# Patient Record
Sex: MALE | Race: WHITE
[De-identification: names, ages, dates, MRNs, and addresses within clinical notes are randomized per-mention and may not be internally consistent; named-entity substitution may affect disease eponyms.]

---

## 2020-01-12 ENCOUNTER — HOSPITAL ENCOUNTER (INPATIENT)
Dept: HOSPITAL 96 - M.ERS | Age: 60
LOS: 3 days | Discharge: HOME | DRG: 193 | End: 2020-01-15
Attending: INTERNAL MEDICINE | Admitting: INTERNAL MEDICINE
Payer: COMMERCIAL

## 2020-01-12 VITALS — SYSTOLIC BLOOD PRESSURE: 122 MMHG | DIASTOLIC BLOOD PRESSURE: 77 MMHG

## 2020-01-12 VITALS — HEIGHT: 72.01 IN | WEIGHT: 224.3 LBS | BODY MASS INDEX: 30.38 KG/M2

## 2020-01-12 VITALS — DIASTOLIC BLOOD PRESSURE: 67 MMHG | SYSTOLIC BLOOD PRESSURE: 165 MMHG

## 2020-01-12 VITALS — DIASTOLIC BLOOD PRESSURE: 82 MMHG | SYSTOLIC BLOOD PRESSURE: 144 MMHG

## 2020-01-12 DIAGNOSIS — J11.1: ICD-10-CM

## 2020-01-12 DIAGNOSIS — I26.99: ICD-10-CM

## 2020-01-12 DIAGNOSIS — I50.9: ICD-10-CM

## 2020-01-12 DIAGNOSIS — I24.9: ICD-10-CM

## 2020-01-12 DIAGNOSIS — Z79.899: ICD-10-CM

## 2020-01-12 DIAGNOSIS — F17.210: ICD-10-CM

## 2020-01-12 DIAGNOSIS — J44.1: ICD-10-CM

## 2020-01-12 DIAGNOSIS — J44.0: ICD-10-CM

## 2020-01-12 DIAGNOSIS — J18.9: Primary | ICD-10-CM

## 2020-01-12 DIAGNOSIS — Z87.442: ICD-10-CM

## 2020-01-12 LAB
ABSOLUTE BASOPHILS: 0.1 THOU/UL (ref 0–0.2)
ABSOLUTE EOSINOPHILS: 0.2 THOU/UL (ref 0–0.7)
ABSOLUTE MONOCYTES: 1 THOU/UL (ref 0–1.2)
ALBUMIN SERPL-MCNC: 3.9 G/DL (ref 3.4–5)
ALP SERPL-CCNC: 84 U/L (ref 46–116)
ALT SERPL-CCNC: 42 U/L (ref 30–65)
ANION GAP SERPL CALC-SCNC: 11 MMOL/L (ref 7–16)
AST SERPL-CCNC: 21 U/L (ref 15–37)
BACTERIA-REFLEX: (no result) /HPF
BASOPHILS NFR BLD AUTO: 0.8 %
BE: -5.8 MMOL/L
BILIRUB SERPL-MCNC: 0.4 MG/DL
BILIRUB UR-MCNC: NEGATIVE MG/DL
BUN SERPL-MCNC: 7 MG/DL (ref 7–18)
CALCIUM SERPL-MCNC: 8.6 MG/DL (ref 8.5–10.1)
CHLORIDE SERPL-SCNC: 102 MMOL/L (ref 98–107)
CO2 SERPL-SCNC: 27 MMOL/L (ref 21–32)
COLOR UR: YELLOW
CREAT SERPL-MCNC: 1.1 MG/DL (ref 0.6–1.3)
EOSINOPHIL NFR BLD: 1.7 %
GLUCOSE SERPL-MCNC: 98 MG/DL (ref 70–99)
GRANULOCYTES NFR BLD MANUAL: 75.2 %
HCT VFR BLD CALC: 50.4 % (ref 42–52)
HGB BLD-MCNC: 17.1 GM/DL (ref 14–18)
INR PPP: 1
KETONES UR STRIP-MCNC: NEGATIVE MG/DL
LYMPHOCYTES # BLD: 1.3 THOU/UL (ref 0.8–5.3)
LYMPHOCYTES NFR BLD AUTO: 12.7 %
MAGNESIUM SERPL-MCNC: 1.9 MG/DL (ref 1.8–2.4)
MCH RBC QN AUTO: 32.2 PG (ref 26–34)
MCHC RBC AUTO-ENTMCNC: 33.9 G/DL (ref 28–37)
MCV RBC: 95.2 FL (ref 80–100)
MONOCYTES NFR BLD: 9.6 %
MPV: 8.2 FL. (ref 7.2–11.1)
MUCUS: (no result) STRN/LPF
NEUTROPHILS # BLD: 7.9 THOU/UL (ref 1.6–8.1)
NUCLEATED RBCS: 0 /100WBC
PCO2 BLD: 33.6 MMHG (ref 35–45)
PLATELET COUNT*: 200 THOU/UL (ref 150–400)
PO2 BLD: 56 MMHG (ref 75–100)
POTASSIUM SERPL-SCNC: 3.9 MMOL/L (ref 3.5–5.1)
PROT SERPL-MCNC: 8.3 G/DL (ref 6.4–8.2)
PROT UR QL STRIP: NEGATIVE
PROTHROMBIN TIME: 10.2 SECONDS (ref 9.2–11.5)
RBC # BLD AUTO: 5.3 MIL/UL (ref 4.5–6)
RBC # UR STRIP: (no result) /UL
RBC #/AREA URNS HPF: (no result) /HPF (ref 0–2)
RDW-CV: 14.1 % (ref 10.5–14.5)
SODIUM SERPL-SCNC: 140 MMOL/L (ref 136–145)
SP GR UR STRIP: 1.02 (ref 1–1.03)
SQUAMOUS: (no result) /LPF (ref 0–3)
URINE CLARITY: CLEAR
URINE GLUCOSE-RANDOM: NEGATIVE
URINE LEUKOCYTES-REFLEX: NEGATIVE
URINE NITRITE-REFLEX: NEGATIVE
URINE WBC-REFLEX: (no result) /HPF (ref 0–5)
UROBILINOGEN UR STRIP-ACNC: 0.2 E.U./DL (ref 0.2–1)
WBC # BLD AUTO: 10.5 THOU/UL (ref 4–11)

## 2020-01-13 VITALS — DIASTOLIC BLOOD PRESSURE: 70 MMHG | SYSTOLIC BLOOD PRESSURE: 108 MMHG

## 2020-01-13 VITALS — SYSTOLIC BLOOD PRESSURE: 115 MMHG | DIASTOLIC BLOOD PRESSURE: 76 MMHG

## 2020-01-13 VITALS — DIASTOLIC BLOOD PRESSURE: 80 MMHG | SYSTOLIC BLOOD PRESSURE: 117 MMHG

## 2020-01-13 VITALS — DIASTOLIC BLOOD PRESSURE: 77 MMHG | SYSTOLIC BLOOD PRESSURE: 143 MMHG

## 2020-01-13 VITALS — SYSTOLIC BLOOD PRESSURE: 119 MMHG | DIASTOLIC BLOOD PRESSURE: 78 MMHG

## 2020-01-13 VITALS — SYSTOLIC BLOOD PRESSURE: 137 MMHG | DIASTOLIC BLOOD PRESSURE: 78 MMHG

## 2020-01-13 LAB
ANION GAP SERPL CALC-SCNC: 12 MMOL/L (ref 7–16)
BUN SERPL-MCNC: 13 MG/DL (ref 7–18)
CALCIUM SERPL-MCNC: 8.4 MG/DL (ref 8.5–10.1)
CHLORIDE SERPL-SCNC: 105 MMOL/L (ref 98–107)
CO2 SERPL-SCNC: 23 MMOL/L (ref 21–32)
CREAT SERPL-MCNC: 1 MG/DL (ref 0.6–1.3)
GLUCOSE SERPL-MCNC: 112 MG/DL (ref 70–99)
MAGNESIUM SERPL-MCNC: 1.9 MG/DL (ref 1.8–2.4)
POTASSIUM SERPL-SCNC: 4 MMOL/L (ref 3.5–5.1)
SODIUM SERPL-SCNC: 140 MMOL/L (ref 136–145)

## 2020-01-13 NOTE — EKG
[place] [address]
Phone:  [phone]                     ELECTROCARDIOGRAM REPORT      
_______________________________________________________________________________
 
Name:       ELMERMITRA                  Room:           [address]    ADM IN  
M.R.#:  [id number]      Account #:      [id number]  
Admission:  20     Attend Phys:    XANDER Armstrong
Discharge:               Date of Birth:  [birth year]  
         Report #: [id number]
    [id number]
_______________________________________________________________________________
THIS REPORT FOR:  //name//                      
 
                         [facility] ED
                                       
Test Date:    2020               Test Time:    12:28:25
Pat Name:     MITRA PAYNE            Department:   
Patient ID:   [id number]            Room:         [facility]
Gender:       M                        Technician:   [facility]
:          1960               Requested By: Nancy Greenwood
Order Number: [id number] MD:   Charlie Carrasquillo
                                 Measurements
Intervals                              Axis          
Rate:         95                       P:            82
GA:           147                      QRS:          -73
QRSD:         99                       T:            89
QT:           341                                    
QTc:          429                                    
                           Interpretive Statements
Sinus rhythm
Left anterior fascicular block
Nonspecific T abnrm, anterolateral leads
No previous ECG available for comparison
 
Electronically Signed On 2020 17:02:51 CST by Charlie Carrasquillo
[email or web address]
 
 
 
 
 
 
 
 
 
 
 
 
 
 
 
 
 
 
  <ELECTRONICALLY SIGNED>
                                           By: Charlie Carrasquillo MD, [facility]   
  20     1702
D: 20 1228   _____________________________________
T: 20 1228   Charlie Carrasquillo MD, FAC     /EPI

## 2020-01-14 VITALS — DIASTOLIC BLOOD PRESSURE: 86 MMHG | SYSTOLIC BLOOD PRESSURE: 129 MMHG

## 2020-01-14 VITALS — SYSTOLIC BLOOD PRESSURE: 119 MMHG | DIASTOLIC BLOOD PRESSURE: 82 MMHG

## 2020-01-14 VITALS — SYSTOLIC BLOOD PRESSURE: 136 MMHG | DIASTOLIC BLOOD PRESSURE: 46 MMHG

## 2020-01-14 VITALS — DIASTOLIC BLOOD PRESSURE: 79 MMHG | SYSTOLIC BLOOD PRESSURE: 124 MMHG

## 2020-01-14 VITALS — SYSTOLIC BLOOD PRESSURE: 160 MMHG | DIASTOLIC BLOOD PRESSURE: 83 MMHG

## 2020-01-14 VITALS — SYSTOLIC BLOOD PRESSURE: 113 MMHG | DIASTOLIC BLOOD PRESSURE: 75 MMHG

## 2020-01-14 VITALS — DIASTOLIC BLOOD PRESSURE: 74 MMHG | SYSTOLIC BLOOD PRESSURE: 129 MMHG

## 2020-01-15 VITALS — DIASTOLIC BLOOD PRESSURE: 73 MMHG | SYSTOLIC BLOOD PRESSURE: 112 MMHG

## 2020-01-15 VITALS — DIASTOLIC BLOOD PRESSURE: 72 MMHG | SYSTOLIC BLOOD PRESSURE: 141 MMHG

## 2020-01-15 VITALS — SYSTOLIC BLOOD PRESSURE: 112 MMHG | DIASTOLIC BLOOD PRESSURE: 73 MMHG

## 2020-01-15 VITALS — SYSTOLIC BLOOD PRESSURE: 139 MMHG | DIASTOLIC BLOOD PRESSURE: 88 MMHG

## 2020-01-16 VITALS — SYSTOLIC BLOOD PRESSURE: 131 MMHG | DIASTOLIC BLOOD PRESSURE: 77 MMHG

## 2020-11-30 ENCOUNTER — HOSPITAL ENCOUNTER (INPATIENT)
Dept: HOSPITAL 96 - M.ERS | Age: 60
LOS: 3 days | Discharge: HOME | DRG: 189 | End: 2020-12-03
Attending: INTERNAL MEDICINE | Admitting: INTERNAL MEDICINE
Payer: COMMERCIAL

## 2020-11-30 VITALS — BODY MASS INDEX: 31.15 KG/M2 | WEIGHT: 230 LBS | HEIGHT: 72.01 IN

## 2020-11-30 VITALS — SYSTOLIC BLOOD PRESSURE: 154 MMHG | DIASTOLIC BLOOD PRESSURE: 102 MMHG

## 2020-11-30 DIAGNOSIS — Z87.442: ICD-10-CM

## 2020-11-30 DIAGNOSIS — G47.10: ICD-10-CM

## 2020-11-30 DIAGNOSIS — J96.01: Primary | ICD-10-CM

## 2020-11-30 DIAGNOSIS — E66.9: ICD-10-CM

## 2020-11-30 DIAGNOSIS — Z71.6: ICD-10-CM

## 2020-11-30 DIAGNOSIS — J44.1: ICD-10-CM

## 2020-11-30 DIAGNOSIS — Z20.828: ICD-10-CM

## 2020-11-30 DIAGNOSIS — B37.9: ICD-10-CM

## 2020-11-30 DIAGNOSIS — F17.210: ICD-10-CM

## 2020-11-30 LAB
ABSOLUTE BASOPHILS: 0.1 THOU/UL (ref 0–0.2)
ABSOLUTE EOSINOPHILS: 0.6 THOU/UL (ref 0–0.7)
ABSOLUTE MONOCYTES: 0.8 THOU/UL (ref 0–1.2)
BASOPHILS NFR BLD AUTO: 0.5 %
EOSINOPHIL NFR BLD: 5.6 %
GRANULOCYTES NFR BLD MANUAL: 69 %
HCT VFR BLD CALC: 48.2 % (ref 42–52)
HGB BLD-MCNC: 16.1 GM/DL (ref 14–18)
LYMPHOCYTES # BLD: 2.1 THOU/UL (ref 0.8–5.3)
LYMPHOCYTES NFR BLD AUTO: 18 %
MCH RBC QN AUTO: 32.1 PG (ref 26–34)
MCHC RBC AUTO-ENTMCNC: 33.4 G/DL (ref 28–37)
MCV RBC: 96.1 FL (ref 80–100)
MONOCYTES NFR BLD: 6.9 %
MPV: 7.8 FL. (ref 7.2–11.1)
NEUTROPHILS # BLD: 7.9 THOU/UL (ref 1.6–8.1)
NUCLEATED RBCS: 0 /100WBC
PLATELET COUNT*: 180 THOU/UL (ref 150–400)
RBC # BLD AUTO: 5.01 MIL/UL (ref 4.5–6)
RDW-CV: 13.7 % (ref 10.5–14.5)
WBC # BLD AUTO: 11.4 THOU/UL (ref 4–11)

## 2020-11-30 NOTE — EMS
[address]  [address]                    EMS Patient Care Report       
_______________________________________________________________________________
 
Name:       MITRA PAYNE                  Room:           [address]    ADM IN  
M.R.#:  [id number]      Account #:      [id number]  
Admission:  20     Attend Phys:    Jasvir Gilbert MD 
Discharge:               Date of Birth:  [birth year]  
         Report #: [id number]
                                                                     [id number]
_______________________________________________________________________________
THIS REPORT FOR:  //name//                      
 
Report Transmitted: 2020 11:57
EMS Care Summary
[facility] Aria FISHER
Incident [id number] @ 2020 22:11
 
Incident Location
[address] [place] [address]
 
Patient
Mitra Payne
Male, 60 Years
 1960
 
Patient Address
[address] [place] [address]
 
Patient History
Chronic Obstructive Pulmonary Disease (COPD),
 
Patient Allergies
No known allergies,
 
 
Chief Complaint
Shortness of Breath
 
Disposition
Transported No Lights/[place]
 
Dispatch Reason
Breathing Problem
 
Transported To
[facility]
 
Narrative
on scene of a 59 y/o male c/o SOB. pt found sitting in tripod position on chair 
a/ox4 in moderate distress. per pt he has been SOB for the last 4 days and it 
has been increasing. per pt he ran out of medication about 1 month ago. SPO2 
checked and lungs checked. pt given o2 via neb mask and albuterol and atrovent. 
pt noted to have increase in SPO2 and decrease work of breathing. pt requested 
transport to [facility]. pt was assisted to [facility] and during transfer pt work 
 
 
 
[address]  [address]                    EMS Patient Care Report       
_______________________________________________________________________________
 
Name:       MITRA PAYNE                  Room:           229-    ADM IN  
M.R.#:  [id number]      Account #:      [id number]  
Admission:  20     Attend Phys:    Jasvir Gilbert MD 
Discharge:               Date of Birth:  [birth year]  
         Report #: [id number]
                                                                     [id number]
_______________________________________________________________________________
of breathing increased as well as an increase in anxiety. pt moved to micu and 
vitals checked and monitor showed NSR. pt calmed down for transport. pt 
transported to [facility] c-2. en route pt reassessed. lungs still had wheezing 
noted. pt given second dose of albuterol via neb mask. pt denies any c/p abd 
pain N/V dizziness headache. IV established in left hand 20g with a saline lock 
using aseptic technique. secondary had no other complaints noted. vitals 
rechecked with no changes noted. arrive [facility] transfer to ER RN in pt room. 
 
Initial Vitals
@22:22SpO2: 92,
@22:29SpO2: 97,
@22:31SpO2: 95,
@22:42SpO2: 98,
@22:31
@22:22P: 85,R: 30,BP: 166/100,
@22:31P: 90,R: 24,BP: 189/144,
@22:43P: 86,R: 24,BP: 142/97,
@22:22GCS: 15,
@22:31GCS: 15,
@22:43GCS: 15,
@22:19
 
Assessments
@22:19MENTAL:SKIN:HEENT:LUNG 
SOUNDS:ABDOMEN:PELVIS//GI:EXTREMITIES:PULSE:NEURO: 
 
Impression
Acute Respiratory Distress (Dyspnea)
 
Procedures
@22:22Other - Medication - 8.000 Liters per Minute (l/min [fluid]) - 
InhalationResponse: Improved@22:22Albuterol - 2.500 Milligrams (mg) - 
InhalationResponse: Improved@22:22Ipratropium - 0.500 Milligrams (mg) - 
InhalationResponse: Improved@22:31Albuterol - 2.500 Milligrams (mg) - 
InhalationResponse: Improved@22:37 cc () Site: Hand-LeftResponse: 
ImprovedSucceeded@22:313-Lead ECGResponse: UnchangedSucceeded 
 
Timeline
22:19,Call Received
22:11,Dispatch Notified
22:11,Psap Call
22:11,Dispatched
22:12,En Route
22:16,On Scene
22:19,At Patient
22:19,BP: / M,PULSE: ,RR:  R,SPO2:  Ox,ETCO2:  ,BG: ,PAIN: ,GCS: ,
 
 
 
[place] [address]                    EMS Patient Care Report       
_______________________________________________________________________________
 
Name:       ELMERMITRA                  Room:           [address]    ADM IN  
.R.#:  [id number]      Account #:      [id number]  
Admission:  20     Attend Phys:    Jasvir Gilbert MD 
Discharge:               Date of Birth:  [birth year]  
         Report #: [id number]
                                                                     [id number]
_______________________________________________________________________________
22:22,Other - Medication - 8.000 Liters per Minute (l/min [fluid]) - 
Inhalation,Response: Improved 
22:22,Albuterol - 2.500 Milligrams (mg) - Inhalation,Response: Improved
22:22,Ipratropium - 0.500 Milligrams (mg) - Inhalation,Response: Improved
22:22,BP: / M,PULSE: ,RR:  R,SPO2: 92 Ox,ETCO2:  ,BG: ,PAIN: ,GCS: ,
22:22,BP: 166/100 M,PULSE: 85,RR: 30 R,SPO2:  Ox,ETCO2:  ,BG: ,PAIN: ,GCS: ,
22:22,BP: / M,PULSE: ,RR:  R,SPO2:  Ox,ETCO2:  ,BG: ,PAIN: ,GCS: 15,
22:29,BP: / M,PULSE: ,RR:  R,SPO2: 97 Ox,ETCO2:  ,BG: ,PAIN: ,GCS: ,
22:31,BP: / M,PULSE: ,RR:  R,SPO2: 95 Ox,ETCO2:  ,BG: ,PAIN: ,GCS: ,
22:31,BP: 189/144 M,PULSE: 90,RR: 24 R,SPO2:  Ox,ETCO2:  ,BG: ,PAIN: ,GCS: ,
22:31,BP: / M,PULSE: ,RR:  R,SPO2:  Ox,ETCO2:  ,BG: ,PAIN: ,GCS: 15,
22:31,Depart Scene
22:31,Albuterol - 2.500 Milligrams (mg) - Inhalation,Response: Improved
22:31,3-Lead ECG,Response: UnchangedSucceeded,
22:31,BP: / M,PULSE: ,RR:  R,SPO2:  Ox,ETCO2:  ,BG: ,PAIN: ,GCS: ,
22:37, cc  Site: Hand-Left,Response: ImprovedSucceeded,
22:42,BP: / M,PULSE: ,RR:  R,SPO2: 98 Ox,ETCO2:  ,BG: ,PAIN: ,GCS: ,
22:42,At Destination
22:43,BP: 142/97 M,PULSE: 86,RR: 24 R,SPO2:  Ox,ETCO2:  ,BG: ,PAIN: ,GCS: ,
22:43,BP: / M,PULSE: ,RR:  R,SPO2:  Ox,ETCO2:  ,BG: ,PAIN: ,GCS: 15,
22:55,Call Closed
 
Disclaimer
v1.1     Copyright 2020 [employer]
This EMS Care Summary contains data elements from the applicable legal record 
(which may be displayed differently). It is designed to provide pertinent 
information for the following purposes: continuity of care, clinical quality, 
and state data reporting. The complete legal record is available to ED staff 
and administrators of the receiving hospital in [employer] Patient Tracker. All data 
is provided "as is."

## 2020-12-01 VITALS — DIASTOLIC BLOOD PRESSURE: 78 MMHG | SYSTOLIC BLOOD PRESSURE: 124 MMHG

## 2020-12-01 VITALS — SYSTOLIC BLOOD PRESSURE: 104 MMHG | DIASTOLIC BLOOD PRESSURE: 66 MMHG

## 2020-12-01 VITALS — DIASTOLIC BLOOD PRESSURE: 64 MMHG | SYSTOLIC BLOOD PRESSURE: 102 MMHG

## 2020-12-01 VITALS — DIASTOLIC BLOOD PRESSURE: 80 MMHG | SYSTOLIC BLOOD PRESSURE: 134 MMHG

## 2020-12-01 VITALS — SYSTOLIC BLOOD PRESSURE: 134 MMHG | DIASTOLIC BLOOD PRESSURE: 80 MMHG

## 2020-12-01 VITALS — SYSTOLIC BLOOD PRESSURE: 113 MMHG | DIASTOLIC BLOOD PRESSURE: 66 MMHG

## 2020-12-01 LAB
ALBUMIN SERPL-MCNC: 3.5 G/DL (ref 3.4–5)
ALP SERPL-CCNC: 66 U/L (ref 46–116)
ALT SERPL-CCNC: 31 U/L (ref 30–65)
ANION GAP SERPL CALC-SCNC: 9 MMOL/L (ref 7–16)
AST SERPL-CCNC: 18 U/L (ref 15–37)
BE: -5.1 MMOL/L
BILIRUB SERPL-MCNC: 0.5 MG/DL
BUN SERPL-MCNC: 7 MG/DL (ref 7–18)
CALCIUM SERPL-MCNC: 8.1 MG/DL (ref 8.5–10.1)
CHLORIDE SERPL-SCNC: 103 MMOL/L (ref 98–107)
CO2 SERPL-SCNC: 26 MMOL/L (ref 21–32)
CREAT SERPL-MCNC: 0.9 MG/DL (ref 0.6–1.3)
GLUCOSE SERPL-MCNC: 112 MG/DL (ref 70–99)
INR PPP: 1
LIPASE: 56 U/L (ref 73–393)
PCO2 BLD: 37.3 MMHG (ref 35–45)
PO2 BLD: 107.5 MMHG (ref 75–100)
POTASSIUM SERPL-SCNC: 3.8 MMOL/L (ref 3.5–5.1)
PROT SERPL-MCNC: 7.2 G/DL (ref 6.4–8.2)
PROTHROMBIN TIME: 10.7 SECONDS (ref 9.2–11.5)
SODIUM SERPL-SCNC: 138 MMOL/L (ref 136–145)

## 2020-12-01 PROCEDURE — 5A09357 ASSISTANCE WITH RESPIRATORY VENTILATION, LESS THAN 24 CONSECUTIVE HOURS, CONTINUOUS POSITIVE AIRWAY PRESSURE: ICD-10-PCS | Performed by: INTERNAL MEDICINE

## 2020-12-01 NOTE — EKG
[place] [address]
Phone:  [phone]                     ELECTROCARDIOGRAM REPORT      
_______________________________________________________________________________
 
Name:         MITRA PAYNE                 Room:          [address]    ADM IN 
.R.#:    [id number]     Account #:     [id number]  
Admission:    20    Attend Phys:   Jasvir Gilbert, 
Discharge:                Date of Birth: [birth year]  
Date of Service: 20 2251  Report #:      [id number]
        [id number]
_______________________________________________________________________________
THIS REPORT FOR:  //name//                      
 
                         [facility] ED
                                       
Test Date:    2020               Test Time:    22:51:42
Pat Name:     MITRA PAYNE            Department:   
Patient ID:   [id number]            Room:         [facility]
Gender:       M                        Technician:   [employer]
:          1960               Requested By: Nancy Greenwood
Order Number: [id number] MD:   Mayco Weeks
                                 Measurements
Intervals                              Axis          
Rate:         90                       P:            83
GA:           152                      QRS:          -75
QRSD:         102                      T:            83
QT:           366                                    
QTc:          448                                    
                           Interpretive Statements
Sinus rhythm with pac
 
Left anterior fascicular block
Baseline wander in lead(s) V1,V3
Compared to ECG 2020 12:28:25
no change
Electronically Signed On 2020 10:06:43 CST by Mayco Weeks
[email or web address]
 
 
 
 
 
 
 
 
 
 
 
 
 
 
 
 
 
 
  <ELECTRONICALLY SIGNED>
                                           By: Mayco Weeks MD, FACC      
  20     1006
D: 20   _____________________________________
T: 20   Mayco Weeks MD, FAC        /EPI

## 2020-12-01 NOTE — NUR
PT ORIENTED TO ROOM AND UNIT, LORETTA LOW AND LOCKED, SIDE RAILS UPX3, CALL LIGHT
IN REACH, TELE APPLIED. WILL CONTINUE TO ASSESS.

## 2020-12-02 VITALS — DIASTOLIC BLOOD PRESSURE: 77 MMHG | SYSTOLIC BLOOD PRESSURE: 118 MMHG

## 2020-12-02 VITALS — SYSTOLIC BLOOD PRESSURE: 129 MMHG | DIASTOLIC BLOOD PRESSURE: 77 MMHG

## 2020-12-02 VITALS — DIASTOLIC BLOOD PRESSURE: 65 MMHG | SYSTOLIC BLOOD PRESSURE: 109 MMHG

## 2020-12-02 VITALS — DIASTOLIC BLOOD PRESSURE: 68 MMHG | SYSTOLIC BLOOD PRESSURE: 142 MMHG

## 2020-12-02 VITALS — DIASTOLIC BLOOD PRESSURE: 62 MMHG | SYSTOLIC BLOOD PRESSURE: 111 MMHG

## 2020-12-02 VITALS — SYSTOLIC BLOOD PRESSURE: 121 MMHG | DIASTOLIC BLOOD PRESSURE: 64 MMHG

## 2020-12-02 NOTE — NUR
ASSUMED PT CARE AT 0730, PT AOX4, NO C/O PAIN OR SHORTNESS OF BREATH BUT
STATES HE BECOMES SHORT OF BREATH SOMETIMES WHEN HE WALKS AROUND. PT TITRATED
FROM 4L TO RA, SATTING ABOVE 90%. PT AMBULATED HALLS THIS AFTERNOON. PT NEEDS
SYMBALTA SCRIPT FOR NEXT 30 DAYS SINCE HIS INSURANCE DOESN'T KICK IN UNTIL JAN
1ST, CM CONSULTED FOR THIS AND AWAITING SCRIPT. PT GOAL IS TO KEEP SATS ABOVE
90% AND REMAIN FREE FROM SHORTNESS OF BREATH. AM ASSESSMENT AS CHARTED, MEDS
PER MAR, HOURLY ROUNDING OBSERVED, PT UP AD ROM, CALL LIGHT W/IN REACH.

## 2020-12-02 NOTE — NUR
CM SPOKE TO THE PT TO COMPLETE CM ASSESSMENT. PT A&O, INDEPENDENT WITH ADL'S,
AND WORKS OUTSIDE THE HOME. PT OWNS 0 DME. PT HAS 0 HX OF HH OR SNF. PT
INFROMS THAT HE DOES NOT HAVE INSURANCE, BUT INFORMS THAT HE WILL HAVE
INSURANCE IN JANUARY. PT ALSO INFORMS THAT HE AS BEEN GOING TO THE '[facility]' FOR MEDICAL CARE AND MANAGEMENT, AND WILL CONTINUE TO USE THEM AT
D/C DUE TO NOT HAVING INSURANCE. PT ASSESSED BY MED ASSIST AND IS MEDICAID
PENDING STATUS. PATIENT'S INSURANCE STATUS MAY BE A BARRIER TO D/C PLANNING IF
THE PT WOULD NEED HOME O2 OR OTHER DME. PT IS CURRENTLY ON 4L O2 AND USING
BIPAP AT [facility]. CM WILL REMAIN AVAILABLE TO ASSIST AND FOLLOW AS NEEDED.

## 2020-12-02 NOTE — NUR
ASSUMED CARE AT 1920, ON [facility]. ALERT AND ORIENT. NO DISTRESS. PT
ASKING FOR SYMBICORT COUPON FROM THE NP HE TALKED YESTERDAY. HIS HOPING TO GET
BEFOR DISCHARGE. CONTINUE MONITORING AND TOWARDS GOALS.

## 2020-12-03 VITALS — SYSTOLIC BLOOD PRESSURE: 123 MMHG | DIASTOLIC BLOOD PRESSURE: 82 MMHG

## 2020-12-03 VITALS — DIASTOLIC BLOOD PRESSURE: 100 MMHG | SYSTOLIC BLOOD PRESSURE: 169 MMHG

## 2020-12-03 VITALS — DIASTOLIC BLOOD PRESSURE: 82 MMHG | SYSTOLIC BLOOD PRESSURE: 123 MMHG

## 2020-12-03 VITALS — SYSTOLIC BLOOD PRESSURE: 121 MMHG | DIASTOLIC BLOOD PRESSURE: 77 MMHG

## 2020-12-03 VITALS — SYSTOLIC BLOOD PRESSURE: 126 MMHG | DIASTOLIC BLOOD PRESSURE: 75 MMHG

## 2020-12-03 VITALS — DIASTOLIC BLOOD PRESSURE: 55 MMHG | SYSTOLIC BLOOD PRESSURE: 99 MMHG

## 2020-12-03 LAB
ABSOLUTE MONOCYTES: 0.8 THOU/UL (ref 0–1.2)
ALBUMIN SERPL-MCNC: 3.1 G/DL (ref 3.4–5)
ALP SERPL-CCNC: 54 U/L (ref 46–116)
ALT SERPL-CCNC: 38 U/L (ref 30–65)
ANION GAP SERPL CALC-SCNC: 10 MMOL/L (ref 7–16)
AST SERPL-CCNC: 23 U/L (ref 15–37)
BILIRUB SERPL-MCNC: 0.2 MG/DL
BUN SERPL-MCNC: 16 MG/DL (ref 7–18)
CALCIUM SERPL-MCNC: 7.9 MG/DL (ref 8.5–10.1)
CHLORIDE SERPL-SCNC: 104 MMOL/L (ref 98–107)
CO2 SERPL-SCNC: 23 MMOL/L (ref 21–32)
CREAT SERPL-MCNC: 0.9 MG/DL (ref 0.6–1.3)
GLUCOSE SERPL-MCNC: 151 MG/DL (ref 70–99)
GRANULOCYTES NFR BLD MANUAL: 89 %
HCT VFR BLD CALC: 47.3 % (ref 42–52)
HGB BLD-MCNC: 15.5 GM/DL (ref 14–18)
LYMPHOCYTES # BLD: 1.4 THOU/UL (ref 0.8–5.3)
LYMPHOCYTES NFR BLD AUTO: 7 %
MCH RBC QN AUTO: 31.9 PG (ref 26–34)
MCHC RBC AUTO-ENTMCNC: 32.8 G/DL (ref 28–37)
MCV RBC: 97.2 FL (ref 80–100)
MONOCYTES NFR BLD: 4 %
MPV: 8.2 FL. (ref 7.2–11.1)
NEUTROPHILS # BLD: 17.2 THOU/UL (ref 1.6–8.1)
NUCLEATED RBCS: 0 /100WBC
PLATELET # BLD EST: ADEQUATE 10*3/UL
PLATELET COUNT*: 177 THOU/UL (ref 150–400)
POTASSIUM SERPL-SCNC: 4 MMOL/L (ref 3.5–5.1)
PROT SERPL-MCNC: 6.7 G/DL (ref 6.4–8.2)
RBC # BLD AUTO: 4.87 MIL/UL (ref 4.5–6)
RBC MORPH BLD: NORMAL
RDW-CV: 14.1 % (ref 10.5–14.5)
SODIUM SERPL-SCNC: 137 MMOL/L (ref 136–145)
WBC # BLD AUTO: 19.3 THOU/UL (ref 4–11)

## 2020-12-03 NOTE — NUR
ASSUMED PT CARE AT 0730, PT AOX4, NO C/O PAIN OR SHORTNESS OF BREATH. PT
TITRATED TO RA AGAIN, SATS ABOVE 90%, PT WORKED W/ RT AND DID WALKING O2
MONITOR, DID WELL. PT GOT SCRIPT FOR SYMBICORT COVERED BY HOSPITAL AND SCRIPT
FOR PREDNISONE CALLED INTO [address] [facility]. PT BEING PICKED UP BY
[facility] AT APPROX 1823, PT DC'D W/ NURSING STAFF TO FRONT ENTRANCE AT APPROX 1815
W/ ALL PAPERWORK AND BELONGINGS TO [employer]. PT COMMUNICATES UNDERSTANDING OF DC
TEACHING.

## 2020-12-03 NOTE — NUR
CM SPOKE TO THE PT TO DISCUSS D/C PLANNING AND CM ASSISTING PT WITH
MEDICATIONS. PT ACCEPTS ASSISSTANCE WITH PRESCRIPTION FOR STYMBICORT. CM
CALLED AND FAXED PT'S SCRIPT TO [facility]. PT INFORMED THAT HE WILL NOT
HAVE COPAY AMOUNT FOR SYMBICORT. PT TEST BY R.T. AND PT DOES NOT NEED HOME
OXYGEN AT D/C. CM WILLL REMAIN AVAILABLE TO ASSIST AND FOLLOW AS NEEDED.

## 2020-12-04 NOTE — CON
[address]  [address]                    CONSULTATION                  
_______________________________________________________________________________
 
Name:       MITRA PAYNE                  Room:           [address] [place]  
M.R.#:  [id number]      Account #:      [id number]  
Admission:  12/01/20     Attend Phys:    Jasvir Gilbert MD 
Discharge:  12/03/20     Date of Birth:  [birth year]  
         Report #: [id number]
                                                                     [id number]  
_______________________________________________________________________________
THIS REPORT FOR:  //name//                      
 
cc:  Xu Gunderson DO                 
     MouseXu DO                                                        ~
 
 
DATE OF SERVICE:  12/03/2020
 
 
Consult has been requested by Viktoriya Schmidt.
 
INDICATION FOR CONSULTATION:  COPD exacerbation.
 
HISTORY OF PRESENT ILLNESS:  A 60-year-old gentleman.  He is an active smoker
and does have a history of COPD, was on Symbicort at home, which he ran out of. 
He subsequently did self-medicate with prednisone; however, continued to have
increasing shortness of breath as well as a cough with yellow sputum production;
and therefore, eventually was admitted here.  He only has mild swelling of lower
extremities.  He is not describing fever or chills.  He has had some runny nose
and blocked nose, which remain at baseline.  He has some sleep complaints, which
remain at baseline.
 
Initially, he was hypoxemic.  He did require a BiPAP.  He is now off BiPAP.  He
was on 4 liters nasal cannula this morning, which has now been titrated down to
2.  He reports improvement in shortness of breath.  He has been on Solu-Medrol. 
He has not been on broad-spectrum antibiotics.
 
REVIEW OF SYSTEMS:  The patient's review of systems for 12 points is negative
except as mentioned above.
 
PAST MEDICAL HISTORY:  COPD, kidney stones, tonsillitis, lower abdominal hernia.
 
SOCIAL HISTORY:  Active smoker, has been smoking for several decades. 
Occasional alcohol use.  No known history of illegal drug use.
 
CURRENT MEDICATIONS:  List in [employer] reviewed.
 
HOME MEDICATIONS:  List also in [employer] reviewed.  Also, see discussion above.
 
FAMILY HISTORY:  No pertinent family history.
 
PHYSICAL EXAMINATION:
GENERAL:  Alert, awake and oriented.
VITAL SIGNS:  He has a pulse of 84 and a blood pressure of 169/100 this morning.
 This, however, appears to be an isolated reading.  Most blood pressures are
within the normal range.  His oxygen had been turned down to 2 liters.  On 4
 
 
 
[place] [address]                    CONSULTATION                  
_______________________________________________________________________________
 
Name:       MITRA PAYNE                  Room:           [address] [place]  
[facility]#:  [id number]      Account #:      [id number]  
Admission:  12/01/20     Attend Phys:    Jasvir Gilbert MD 
Discharge:  12/03/20     Date of Birth:  [birth year]  
         Report #: [id number]
                                                                     [id number]  
_______________________________________________________________________________
 
 
liters, he was saturating 94% earlier.  He has a pulse of 84.  He is afebrile
with a temperature of 36.6.
HEENT:  Head is normocephalic and atraumatic.  Pupils are equal and reactive. 
He does have thrush in his throat.  He has a narrow airway.
NECK:  Does not show raised JVP, asymmetry, mass or lymph nodes.
CHEST:  Symmetrical expansion on inspection and palpation.  On auscultation,
breath sounds are bilaterally equal, decreased.  No added sounds.
HEART:  Regular.  There is no murmur.
ABDOMEN:  Soft and nontender.
EXTREMITIES:  Lower extremities show trace edema, no calf tenderness.
SKIN:  Dry and intact.
NEUROLOGICAL:  Moves all extremities bilaterally equally and spontaneously with
no focal deficit identified.
 
LABORATORY DATA:  The patient's lab work is in [facility].  This was reviewed. 
The patient's chest x-ray from 11/30 also in [facility] reviewed, shows changes
consistent with COPD with no acute infiltrates identified.
 
ASSESSMENT AND PLAN:
1.  Acute hypoxemic respiratory failure secondary to chronic obstructive
pulmonary disease.  We will do a chest x-ray now and see where we stand. 
Recommended trying to taper him off oxygen.  If the patient is able to maintain
O2 saturations on room air then I do not feel strongly either way regarding
discharging him today or tomorrow morning.  If he is still requiring
supplemental oxygen at rest then I would favor keeping him in the hospital
tomorrow.  Assessment is underway.
2.  Chronic obstructive pulmonary disease exacerbation.  I agree with
Solu-Medrol, which has now been discontinued.  He remains on oxygen.  I may
order more Solu-Medrol for this evening.  Otherwise, I agree with starting a
prednisone taper tomorrow.  He has had yellow sputum production, which is the
reason that I ordered doxycycline.  If there are significant infiltrates seen on
the chest x-ray then additional antibiotics can also be considered.
3.  Thrush.  Fluconazole.
4.  Hypersomnia.  It appears likely that he has underlying sleep apnea.  I will
be happy to arrange an outpatient sleep study.
5.  I would also be happy to see the patient in the office in January and then
assess further.
 
Thanks for this consultation.
 
 
 
<ELECTRONICALLY SIGNED>
                                        By:  Quinton Yee MD              
12/04/20     1441
D: 12/03/20 1206_______________________________________
T: 12/03/20 1221Aisi Yee MD                 /nt

## 2021-01-14 ENCOUNTER — HOSPITAL ENCOUNTER (INPATIENT)
Dept: HOSPITAL 96 - M.ERS | Age: 61
LOS: 2 days | Discharge: HOME | DRG: 189 | End: 2021-01-16
Attending: INTERNAL MEDICINE | Admitting: INTERNAL MEDICINE
Payer: COMMERCIAL

## 2021-01-14 VITALS — DIASTOLIC BLOOD PRESSURE: 80 MMHG | SYSTOLIC BLOOD PRESSURE: 127 MMHG

## 2021-01-14 VITALS — SYSTOLIC BLOOD PRESSURE: 143 MMHG | DIASTOLIC BLOOD PRESSURE: 94 MMHG

## 2021-01-14 VITALS — WEIGHT: 253 LBS | HEIGHT: 72.01 IN | BODY MASS INDEX: 34.27 KG/M2

## 2021-01-14 DIAGNOSIS — J44.1: ICD-10-CM

## 2021-01-14 DIAGNOSIS — E66.01: ICD-10-CM

## 2021-01-14 DIAGNOSIS — F17.200: ICD-10-CM

## 2021-01-14 DIAGNOSIS — Z79.899: ICD-10-CM

## 2021-01-14 DIAGNOSIS — T38.0X5A: ICD-10-CM

## 2021-01-14 DIAGNOSIS — J96.01: Primary | ICD-10-CM

## 2021-01-14 DIAGNOSIS — Z20.822: ICD-10-CM

## 2021-01-14 DIAGNOSIS — R73.9: ICD-10-CM
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
## 2021-01-14 LAB
ABSOLUTE BASOPHILS: 0.1 THOU/UL (ref 0–0.2)
ABSOLUTE EOSINOPHILS: 0.5 THOU/UL (ref 0–0.7)
ABSOLUTE MONOCYTES: 0.9 THOU/UL (ref 0–1.2)
ALBUMIN SERPL-MCNC: 3.7 G/DL (ref 3.4–5)
ALP SERPL-CCNC: 73 U/L (ref 46–116)
ALT SERPL-CCNC: 33 U/L (ref 30–65)
ANION GAP SERPL CALC-SCNC: 8 MMOL/L (ref 7–16)
APTT BLD: 27.6 SECONDS (ref 25–31.3)
AST SERPL-CCNC: 16 U/L (ref 15–37)
BASOPHILS NFR BLD AUTO: 0.8 %
BE: -2.5 MMOL/L
BILIRUB SERPL-MCNC: 0.4 MG/DL
BUN SERPL-MCNC: 11 MG/DL (ref 7–18)
CALCIUM SERPL-MCNC: 8.4 MG/DL (ref 8.5–10.1)
CHLORIDE SERPL-SCNC: 107 MMOL/L (ref 98–107)
CO2 SERPL-SCNC: 27 MMOL/L (ref 21–32)
CREAT SERPL-MCNC: 0.9 MG/DL (ref 0.6–1.3)
EOSINOPHIL NFR BLD: 4.5 %
GLUCOSE SERPL-MCNC: 107 MG/DL (ref 70–99)
GRANULOCYTES NFR BLD MANUAL: 65.2 %
HCT VFR BLD CALC: 49.8 % (ref 42–52)
HGB BLD-MCNC: 16.5 GM/DL (ref 14–18)
INR PPP: 1
LYMPHOCYTES # BLD: 2.6 THOU/UL (ref 0.8–5.3)
LYMPHOCYTES NFR BLD AUTO: 22.2 %
MAGNESIUM SERPL-MCNC: 2.2 MG/DL (ref 1.8–2.4)
MCH RBC QN AUTO: 31.6 PG (ref 26–34)
MCHC RBC AUTO-ENTMCNC: 33.2 G/DL (ref 28–37)
MCV RBC: 95.1 FL (ref 80–100)
MONOCYTES NFR BLD: 7.3 %
MPV: 8.2 FL. (ref 7.2–11.1)
NEUTROPHILS # BLD: 7.7 THOU/UL (ref 1.6–8.1)
NUCLEATED RBCS: 0 /100WBC
PCO2 BLD: 37.5 MMHG (ref 35–45)
PLATELET COUNT*: 187 THOU/UL (ref 150–400)
PO2 BLD: 65.6 MMHG (ref 75–100)
POTASSIUM SERPL-SCNC: 3.8 MMOL/L (ref 3.5–5.1)
PROT SERPL-MCNC: 7.4 G/DL (ref 6.4–8.2)
PROTHROMBIN TIME: 10.3 SECONDS (ref 9.2–11.5)
RBC # BLD AUTO: 5.23 MIL/UL (ref 4.5–6)
RDW-CV: 14 % (ref 10.5–14.5)
SODIUM SERPL-SCNC: 142 MMOL/L (ref 136–145)
WBC # BLD AUTO: 11.8 THOU/UL (ref 4–11)

## 2021-01-15 VITALS — DIASTOLIC BLOOD PRESSURE: 86 MMHG | SYSTOLIC BLOOD PRESSURE: 146 MMHG

## 2021-01-15 VITALS — DIASTOLIC BLOOD PRESSURE: 85 MMHG | SYSTOLIC BLOOD PRESSURE: 123 MMHG

## 2021-01-15 VITALS — SYSTOLIC BLOOD PRESSURE: 154 MMHG | DIASTOLIC BLOOD PRESSURE: 96 MMHG

## 2021-01-15 VITALS — SYSTOLIC BLOOD PRESSURE: 146 MMHG | DIASTOLIC BLOOD PRESSURE: 95 MMHG

## 2021-01-15 VITALS — DIASTOLIC BLOOD PRESSURE: 82 MMHG | SYSTOLIC BLOOD PRESSURE: 146 MMHG

## 2021-01-15 LAB
BILIRUB UR-MCNC: NEGATIVE MG/DL
COLOR UR: YELLOW
KETONES UR STRIP-MCNC: NEGATIVE MG/DL
PROT UR QL STRIP: NEGATIVE
RBC # UR STRIP: NEGATIVE /UL
SP GR UR STRIP: 1.01 (ref 1–1.03)
URINE CLARITY: CLEAR
URINE GLUCOSE-RANDOM: NEGATIVE
URINE LEUKOCYTES-REFLEX: NEGATIVE
URINE NITRITE-REFLEX: NEGATIVE
UROBILINOGEN UR STRIP-ACNC: 0.2 E.U./DL (ref 0.2–1)

## 2021-01-15 NOTE — EKG
[place] [address]
Phone:  [phone]                     ELECTROCARDIOGRAM REPORT      
_______________________________________________________________________________
 
Name:         MITRA PAYNE                 Room:          [address]    ADM IN 
.R.#:    [id number]     Account #:     [id number]  
Admission:    21    Attend Phys:   Jasvir Gilbert, 
Discharge:                Date of Birth: [birth year]  
Date of Service: 21  Report #:      [id number]
        [id number]
_______________________________________________________________________________
THIS REPORT FOR:  //name//                      
 
                         [facility] ED
                                       
Test Date:    2021               Test Time:    19:56:22
Pat Name:     MITRA PAYNE            Department:   
Patient ID:   [id number]            Room:         [facility]
Gender:       M                        Technician:   [place]
:          1960               Requested By: Nancy Greenwood
Order Number: [id number] MD:   Charlie Carrasquillo
                                 Measurements
Intervals                              Axis          
Rate:         91                       P:            69
NE:           148                      QRS:          -73
QRSD:         98                       T:            86
QT:           351                                    
QTc:          432                                    
                           Interpretive Statements
Sinus rhythm
Left anterior fascicular block
Compared to ECG 2020 22:51:42
Atrial premature complex(es) no longer present
Electronically Signed On 1- 12:57:37 CST by Charlie Carrasquillo
[email or web address]
 
 
 
 
 
 
 
 
 
 
 
 
 
 
 
 
 
 
 
 
  <ELECTRONICALLY SIGNED>
                                           By: Charlie Carrasquillo MD, FACC   
  01/15/21     1257
D: 21   _____________________________________
T: 21   Charlie Carrasquillo MD, FAC     /EPI

## 2021-01-16 VITALS — SYSTOLIC BLOOD PRESSURE: 121 MMHG | DIASTOLIC BLOOD PRESSURE: 86 MMHG

## 2021-01-16 VITALS — DIASTOLIC BLOOD PRESSURE: 86 MMHG | SYSTOLIC BLOOD PRESSURE: 121 MMHG

## 2021-01-16 VITALS — DIASTOLIC BLOOD PRESSURE: 82 MMHG | SYSTOLIC BLOOD PRESSURE: 136 MMHG

## 2021-01-16 LAB
ANION GAP SERPL CALC-SCNC: 12 MMOL/L (ref 7–16)
BUN SERPL-MCNC: 14 MG/DL (ref 7–18)
CALCIUM SERPL-MCNC: 8.8 MG/DL (ref 8.5–10.1)
CHLORIDE SERPL-SCNC: 106 MMOL/L (ref 98–107)
CO2 SERPL-SCNC: 22 MMOL/L (ref 21–32)
CREAT SERPL-MCNC: 1 MG/DL (ref 0.6–1.3)
GLUCOSE SERPL-MCNC: 134 MG/DL (ref 70–99)
HCT VFR BLD CALC: 47.5 % (ref 42–52)
HGB BLD-MCNC: 15.7 GM/DL (ref 14–18)
MCH RBC QN AUTO: 31.4 PG (ref 26–34)
MCHC RBC AUTO-ENTMCNC: 33 G/DL (ref 28–37)
MCV RBC: 95.1 FL (ref 80–100)
MPV: 8.7 FL. (ref 7.2–11.1)
PLATELET COUNT*: 215 THOU/UL (ref 150–400)
POTASSIUM SERPL-SCNC: 4.1 MMOL/L (ref 3.5–5.1)
RBC # BLD AUTO: 4.99 MIL/UL (ref 4.5–6)
RDW-CV: 13.8 % (ref 10.5–14.5)
SODIUM SERPL-SCNC: 140 MMOL/L (ref 136–145)
WBC # BLD AUTO: 18.6 THOU/UL (ref 4–11)

## 2021-02-09 ENCOUNTER — HOSPITAL ENCOUNTER (EMERGENCY)
Dept: HOSPITAL 96 - M.ERS | Age: 61
Discharge: HOME | End: 2021-02-09
Payer: COMMERCIAL

## 2021-02-09 VITALS — SYSTOLIC BLOOD PRESSURE: 115 MMHG | DIASTOLIC BLOOD PRESSURE: 75 MMHG

## 2021-02-09 VITALS — BODY MASS INDEX: 34.48 KG/M2 | HEIGHT: 72 IN | WEIGHT: 254.57 LBS

## 2021-02-09 DIAGNOSIS — Z20.822: ICD-10-CM

## 2021-02-09 DIAGNOSIS — Z87.442: ICD-10-CM

## 2021-02-09 DIAGNOSIS — J44.9: Primary | ICD-10-CM

## 2021-02-09 DIAGNOSIS — Z90.89: ICD-10-CM

## 2021-02-09 LAB
ABSOLUTE BASOPHILS: 0.1 THOU/UL (ref 0–0.2)
ABSOLUTE EOSINOPHILS: 0.3 THOU/UL (ref 0–0.7)
ABSOLUTE MONOCYTES: 0.7 THOU/UL (ref 0–1.2)
ALBUMIN SERPL-MCNC: 3.5 G/DL (ref 3.4–5)
ALP SERPL-CCNC: 62 U/L (ref 46–116)
ALT SERPL-CCNC: 32 U/L (ref 30–65)
ANION GAP SERPL CALC-SCNC: 7 MMOL/L (ref 7–16)
APTT BLD: 28.3 SECONDS (ref 25–31.3)
AST SERPL-CCNC: 16 U/L (ref 15–37)
BASOPHILS NFR BLD AUTO: 0.7 %
BILIRUB SERPL-MCNC: 0.4 MG/DL
BUN SERPL-MCNC: 11 MG/DL (ref 7–18)
CALCIUM SERPL-MCNC: 8.5 MG/DL (ref 8.5–10.1)
CHLORIDE SERPL-SCNC: 106 MMOL/L (ref 98–107)
CO2 SERPL-SCNC: 25 MMOL/L (ref 21–32)
CREAT SERPL-MCNC: 1 MG/DL (ref 0.6–1.3)
EOSINOPHIL NFR BLD: 3.7 %
GLUCOSE SERPL-MCNC: 106 MG/DL (ref 70–99)
GRANULOCYTES NFR BLD MANUAL: 70 %
HCT VFR BLD CALC: 47.2 % (ref 42–52)
HGB BLD-MCNC: 15.9 GM/DL (ref 14–18)
INR PPP: 1
LIPASE: 106 U/L (ref 73–393)
LYMPHOCYTES # BLD: 1.6 THOU/UL (ref 0.8–5.3)
LYMPHOCYTES NFR BLD AUTO: 17.8 %
MAGNESIUM SERPL-MCNC: 2.1 MG/DL (ref 1.8–2.4)
MCH RBC QN AUTO: 32.3 PG (ref 26–34)
MCHC RBC AUTO-ENTMCNC: 33.8 G/DL (ref 28–37)
MCV RBC: 95.6 FL (ref 80–100)
MONOCYTES NFR BLD: 7.8 %
MPV: 7.7 FL. (ref 7.2–11.1)
NEUTROPHILS # BLD: 6.2 THOU/UL (ref 1.6–8.1)
NT-PRO BRAIN NAT PEPTIDE: 53 PG/ML (ref ?–300)
NUCLEATED RBCS: 0 /100WBC
PLATELET COUNT*: 196 THOU/UL (ref 150–400)
POTASSIUM SERPL-SCNC: 4.1 MMOL/L (ref 3.5–5.1)
PROT SERPL-MCNC: 7.1 G/DL (ref 6.4–8.2)
PROTHROMBIN TIME: 10.4 SECONDS (ref 9.2–11.5)
RBC # BLD AUTO: 4.94 MIL/UL (ref 4.5–6)
RDW-CV: 13.8 % (ref 10.5–14.5)
SODIUM SERPL-SCNC: 138 MMOL/L (ref 136–145)
WBC # BLD AUTO: 8.9 THOU/UL (ref 4–11)

## 2021-02-10 NOTE — EKG
[place] [address]
Phone:  [phone]                     ELECTROCARDIOGRAM REPORT      
_______________________________________________________________________________
 
Name:         MITRA PAYNE                 Room:                     [facility]#:    [id number]     Account #:     [id number]  
Admission:    21    Attend Phys:                     
Discharge:    21    Date of Birth: [birth year]  
Date of Service: 21  Report #:      [id number]
        [id number]
_______________________________________________________________________________
THIS REPORT FOR:  //name//                      
 
                         [facility] ED
                                       
Test Date:    2021               Test Time:    06:27:12
Pat Name:     MITRA PAYNE            Department:   
Patient ID:   [id number]            Room:          
Gender:                               Technician:   CONNIE
:          1960               Requested By: Brown Davenport
Order Number: [id number] MD:   Benedict Jean
                                 Measurements
Intervals                              Axis          
Rate:         87                       P:            65
TX:           149                      QRS:          -70
QRSD:         96                       T:            79
QT:           355                                    
QTc:          427                                    
                           Interpretive Statements
Sinus rhythm
Left anterior fascicular block
Compared to ECG 2021 19:56:22
No significant changes
Electronically Signed On 2- 14:05:05 CST by Benedict Jean
[email or web address]
 
 
 
 
 
 
 
 
 
 
 
 
 
 
 
 
 
 
 
 
  <ELECTRONICALLY SIGNED>
                                           By: Benedict Jean MD, [facility]     
  02/10/21     1405
D: 21 0627   _____________________________________
T: 21 0627   Benedict Jean MD, [facility]       /EPI

## 2021-02-25 ENCOUNTER — HOSPITAL ENCOUNTER (OUTPATIENT)
Dept: HOSPITAL 96 - M.ERS | Age: 61
Setting detail: OBSERVATION
LOS: 1 days | Discharge: HOME | End: 2021-02-26
Attending: INTERNAL MEDICINE | Admitting: INTERNAL MEDICINE
Payer: COMMERCIAL

## 2021-02-25 VITALS — SYSTOLIC BLOOD PRESSURE: 141 MMHG | DIASTOLIC BLOOD PRESSURE: 91 MMHG

## 2021-02-25 VITALS — BODY MASS INDEX: 34.29 KG/M2 | WEIGHT: 253.2 LBS | HEIGHT: 72.01 IN

## 2021-02-25 VITALS — SYSTOLIC BLOOD PRESSURE: 131 MMHG | DIASTOLIC BLOOD PRESSURE: 81 MMHG

## 2021-02-25 VITALS — SYSTOLIC BLOOD PRESSURE: 115 MMHG | DIASTOLIC BLOOD PRESSURE: 70 MMHG

## 2021-02-25 DIAGNOSIS — Z90.89: ICD-10-CM

## 2021-02-25 DIAGNOSIS — Z20.822: ICD-10-CM

## 2021-02-25 DIAGNOSIS — F17.210: ICD-10-CM

## 2021-02-25 DIAGNOSIS — Z88.8: ICD-10-CM

## 2021-02-25 DIAGNOSIS — E66.9: ICD-10-CM

## 2021-02-25 DIAGNOSIS — J44.1: Primary | ICD-10-CM

## 2021-02-25 LAB
ABSOLUTE BASOPHILS: 0.1 THOU/UL (ref 0–0.2)
ABSOLUTE EOSINOPHILS: 0.2 THOU/UL (ref 0–0.7)
ABSOLUTE MONOCYTES: 0.9 THOU/UL (ref 0–1.2)
ALBUMIN SERPL-MCNC: 3.3 G/DL (ref 3.4–5)
ALP SERPL-CCNC: 71 U/L (ref 46–116)
ALT SERPL-CCNC: 32 U/L (ref 30–65)
ANION GAP SERPL CALC-SCNC: 11 MMOL/L (ref 7–16)
APTT BLD: 28.5 SECONDS (ref 25–31.3)
AST SERPL-CCNC: 15 U/L (ref 15–37)
BASOPHILS NFR BLD AUTO: 1.3 %
BILIRUB SERPL-MCNC: 0.3 MG/DL
BUN SERPL-MCNC: 10 MG/DL (ref 7–18)
CALCIUM SERPL-MCNC: 8.8 MG/DL (ref 8.5–10.1)
CHLORIDE SERPL-SCNC: 104 MMOL/L (ref 98–107)
CO2 SERPL-SCNC: 25 MMOL/L (ref 21–32)
CREAT SERPL-MCNC: 1 MG/DL (ref 0.6–1.3)
EOSINOPHIL NFR BLD: 2.2 %
GLUCOSE SERPL-MCNC: 113 MG/DL (ref 70–99)
GRANULOCYTES NFR BLD MANUAL: 68.8 %
HCT VFR BLD CALC: 46.7 % (ref 42–52)
HGB BLD-MCNC: 15.5 GM/DL (ref 14–18)
INR PPP: 1
LIPASE: 92 U/L (ref 73–393)
LYMPHOCYTES # BLD: 2.2 THOU/UL (ref 0.8–5.3)
LYMPHOCYTES NFR BLD AUTO: 19.4 %
MAGNESIUM SERPL-MCNC: 2.2 MG/DL (ref 1.8–2.4)
MCH RBC QN AUTO: 31.8 PG (ref 26–34)
MCHC RBC AUTO-ENTMCNC: 33.1 G/DL (ref 28–37)
MCV RBC: 95.9 FL (ref 80–100)
MONOCYTES NFR BLD: 8.3 %
MPV: 8.3 FL. (ref 7.2–11.1)
NEUTROPHILS # BLD: 7.8 THOU/UL (ref 1.6–8.1)
NT-PRO BRAIN NAT PEPTIDE: 41 PG/ML (ref ?–300)
NUCLEATED RBCS: 0 /100WBC
PLATELET COUNT*: 195 THOU/UL (ref 150–400)
POTASSIUM SERPL-SCNC: 3.9 MMOL/L (ref 3.5–5.1)
PROT SERPL-MCNC: 6.8 G/DL (ref 6.4–8.2)
PROTHROMBIN TIME: 10.3 SECONDS (ref 9.2–11.5)
RBC # BLD AUTO: 4.87 MIL/UL (ref 4.5–6)
RDW-CV: 13.9 % (ref 10.5–14.5)
SODIUM SERPL-SCNC: 140 MMOL/L (ref 136–145)
WBC # BLD AUTO: 11.3 THOU/UL (ref 4–11)

## 2021-02-26 VITALS — DIASTOLIC BLOOD PRESSURE: 73 MMHG | SYSTOLIC BLOOD PRESSURE: 117 MMHG

## 2021-02-26 VITALS — SYSTOLIC BLOOD PRESSURE: 123 MMHG | DIASTOLIC BLOOD PRESSURE: 72 MMHG

## 2021-03-04 ENCOUNTER — HOSPITAL ENCOUNTER (OUTPATIENT)
Dept: HOSPITAL 96 - M.ERS | Age: 61
Setting detail: OBSERVATION
LOS: 2 days | Discharge: HOME | End: 2021-03-06
Attending: INTERNAL MEDICINE | Admitting: INTERNAL MEDICINE
Payer: COMMERCIAL

## 2021-03-04 VITALS — BODY MASS INDEX: 35.23 KG/M2 | WEIGHT: 260.1 LBS | HEIGHT: 72.01 IN

## 2021-03-04 VITALS — DIASTOLIC BLOOD PRESSURE: 92 MMHG | SYSTOLIC BLOOD PRESSURE: 134 MMHG

## 2021-03-04 VITALS — DIASTOLIC BLOOD PRESSURE: 100 MMHG | SYSTOLIC BLOOD PRESSURE: 150 MMHG

## 2021-03-04 VITALS — DIASTOLIC BLOOD PRESSURE: 70 MMHG | SYSTOLIC BLOOD PRESSURE: 120 MMHG

## 2021-03-04 DIAGNOSIS — B37.0: ICD-10-CM

## 2021-03-04 DIAGNOSIS — G47.10: ICD-10-CM

## 2021-03-04 DIAGNOSIS — Z90.89: ICD-10-CM

## 2021-03-04 DIAGNOSIS — Z20.822: ICD-10-CM

## 2021-03-04 DIAGNOSIS — J96.10: ICD-10-CM

## 2021-03-04 DIAGNOSIS — R79.89: ICD-10-CM

## 2021-03-04 DIAGNOSIS — J44.1: Primary | ICD-10-CM

## 2021-03-04 DIAGNOSIS — E66.9: ICD-10-CM

## 2021-03-04 LAB
ABSOLUTE BASOPHILS: 0.1 THOU/UL (ref 0–0.2)
ABSOLUTE EOSINOPHILS: 0.5 THOU/UL (ref 0–0.7)
ABSOLUTE MONOCYTES: 0.8 THOU/UL (ref 0–1.2)
ALBUMIN SERPL-MCNC: 3.3 G/DL (ref 3.4–5)
ALP SERPL-CCNC: 69 U/L (ref 46–116)
ALT SERPL-CCNC: 38 U/L (ref 30–65)
ANION GAP SERPL CALC-SCNC: 10 MMOL/L (ref 7–16)
AST SERPL-CCNC: 13 U/L (ref 15–37)
BASOPHILS NFR BLD AUTO: 1.2 %
BILIRUB SERPL-MCNC: 0.4 MG/DL
BUN SERPL-MCNC: 12 MG/DL (ref 7–18)
CALCIUM SERPL-MCNC: 9.5 MG/DL (ref 8.5–10.1)
CHLORIDE SERPL-SCNC: 104 MMOL/L (ref 98–107)
CO2 SERPL-SCNC: 25 MMOL/L (ref 21–32)
CREAT SERPL-MCNC: 1 MG/DL (ref 0.6–1.3)
EOSINOPHIL NFR BLD: 5 %
GLUCOSE SERPL-MCNC: 107 MG/DL (ref 70–99)
GRANULOCYTES NFR BLD MANUAL: 63.3 %
HCT VFR BLD CALC: 46.7 % (ref 42–52)
HGB BLD-MCNC: 15.6 GM/DL (ref 14–18)
INR PPP: 1
LYMPHOCYTES # BLD: 2.5 THOU/UL (ref 0.8–5.3)
LYMPHOCYTES NFR BLD AUTO: 23 %
MCH RBC QN AUTO: 31.8 PG (ref 26–34)
MCHC RBC AUTO-ENTMCNC: 33.4 G/DL (ref 28–37)
MCV RBC: 95.2 FL (ref 80–100)
MONOCYTES NFR BLD: 7.5 %
MPV: 7.9 FL. (ref 7.2–11.1)
NEUTROPHILS # BLD: 6.9 THOU/UL (ref 1.6–8.1)
NUCLEATED RBCS: 0 /100WBC
PLATELET COUNT*: 180 THOU/UL (ref 150–400)
POTASSIUM SERPL-SCNC: 4 MMOL/L (ref 3.5–5.1)
PROT SERPL-MCNC: 6.8 G/DL (ref 6.4–8.2)
PROTHROMBIN TIME: 10.2 SECONDS (ref 9.2–11.5)
RBC # BLD AUTO: 4.91 MIL/UL (ref 4.5–6)
RDW-CV: 13.8 % (ref 10.5–14.5)
SODIUM SERPL-SCNC: 139 MMOL/L (ref 136–145)
WBC # BLD AUTO: 10.9 THOU/UL (ref 4–11)

## 2021-03-04 NOTE — CON
[address]  [address]                    CONSULTATION                  
_______________________________________________________________________________
 
Name:       MITRA PAYNE                  Room:           [address] 
M.R.#:  [id number]      Account #:      [id number]  
Admission:  03/04/21     Attend Phys:    Jasvir Gilbert MD 
Discharge:               Date of Birth:  [birth year]  
         Report #: [id number]
                                                                     [id number]  
_______________________________________________________________________________
THIS REPORT FOR:  
 
cc:  MARIJA - No family physician/PCP 
     MARIJA - No family physician/PCP                                        ~
     Quinton Yee MD               
 
 
DATE OF SERVICE:  03/05/2021
 
 
Consult has been requested by Dr. Betts.
 
INDICATION FOR CONSULTATION:  Shortness of breath.
 
HISTORY OF PRESENT ILLNESS:  This is a 60-year-old gentleman with a past medical
history of COPD.  I have previously seen him in this hospital in 11/2020.  The
patient is an active smoker in addition to having COPD.  The patient by clinical
history also appears to have obstructive sleep apnea.  The patient has not been
on a CPAP or a BiPAP.
 
It appears that the patient has not been on any controller medicine for COPD
long-term.  He does have Symbicort at home; however, he has not only been using
it irregularly, he does not in fact know the correct technique to use the
inhaler.  He therefore has been treating himself with prednisone.  At times,
prednisone has been prescribed by his primary care physician and also during
hospital admissions.  At other times, he has also been self-medicating with
prednisone, possibly left over from previous prescriptions.  He states that he
takes prednisone; whenever he takes it, he gets better and then he stops taking
it and then, he has another exacerbation.  Since I last saw him in November, he
has had several admissions to this hospital.  The patient does have disturbed
sleep at night as well as sleepiness during the day as well.  He also does have
clear nasal discharge as well as blocked nose as well.  His nose complaints are
at baseline.
 
Once again, the patient is now admitted with increasing shortness of breath. 
His shortness of breath is primarily on exertion.  At times, he is having
shortness of breath at rest as well.  He is significantly short of breath on
minimal exertion such as walking just 1 or 2 steps.  He does not have orthopnea.
 He does have a cough; there is not much sputum.  There is no chest pain.  His
nasal complaints are at baseline.  There is no fever or chills.  He does not
have nausea or vomiting.  There is no swelling of lower extremities or calf
pain.  He was not complaining of heartburn at this time.
 
REVIEW OF SYSTEMS:  The patient's review of systems for 12 points is negative
except as mentioned above.
 
 
 
 
[place] [address]                    CONSULTATION                  
_______________________________________________________________________________
 
Name:       MITRA PAYNE                  Room:           [address] [place] 
M.R.#:  [id number]      Account #:      [id number]  
Admission:  03/04/21     Attend Phys:    Jasvir Gilbert MD 
Discharge:               Date of Birth:  [birth year]  
         Report #: [id number]
                                                                     [id number]  
_______________________________________________________________________________
 
 
PAST MEDICAL HISTORY:  COPD; obstructive sleep apnea by clinical history, not
diagnosed previously with a sleep study; renal stones; tonsillitis; lower
abdominal hernia; status post tonsillectomy; status post hernia repair; kidney
stone removal.
 
SOCIAL HISTORY:  He has an extensive history of smoking, has been smoking for
several decades; he says that he has now cut down to 4 or 5 cigarettes a day and
has smoked much more in the past.  No known history of heavy alcohol use or
illegal drug use.
 
CURRENT MEDICATIONS:  List in [employer] [place] reviewed.
 
HOME MEDICATIONS:  List in also [employer] [place] reviewed.  See also discussion above.
 
FAMILY HISTORY:  No pertinent family history.
 
ALLERGIES:  No known drug allergies.
 
PHYSICAL EXAMINATION:
GENERAL:  He is alert, awake and oriented.
VITAL SIGNS:  Has a pulse of 98 and a blood pressure of 137/87; he is saturating
92%, he is on room air; his respiratory rate is 18.  Body mass index is 35.3.
HEENT:  Head is normocephalic and atraumatic.  Pupils are equal and reactive. 
He has a significant amount of thrush in his throat.  His airway is narrow.  It
is around Mallampati 3.
NECK:  Does not show raised JVP, asymmetry, mass or lymph nodes.
CHEST:  Symmetrical expansion on inspection and palpation.  On auscultation,
breath sounds are bilaterally equal, but decreased.  I do not hear any added
sounds.
HEART:  Regular.  There is no murmur.
ABDOMEN:  Soft and nontender.
EXTREMITIES:  Lower extremities show no edema, no calf tenderness.
SKIN:  Dry and intact.
NEUROLOGICAL:  Moves all extremities bilaterally equally and spontaneously with
no focal deficit identified.
 
LABORATORY DATA:  The patient's chest x-ray reviewed, shows some chronic
changes, there are no acute infiltrates identified.  The patient's lab work is
in [employer] [place] and this is reviewed.  Arterial blood gas last performed in January
shows normal pCO2.  The patient's COVID-19 screen is negative.
 
ASSESSMENT AND PLAN:
1.  Shortness of breath.  The patient appears to have chronic obstructive
pulmonary disease as well as obstructive sleep apnea for which he does not
 
 
 
[address]  [address]                    CONSULTATION                  
_______________________________________________________________________________
 
Name:       MITRA PAYNE                  Room:           [address] [place] MIRANDA#:  [id number]      Account #:      [id number]  
Admission:  03/04/21     Attend Phys:    Jasvir Gilbert MD 
Discharge:               Date of Birth:  [birth year]  
         Report #: [id number]
                                                                     [id number]  
_______________________________________________________________________________
 
 
appear to be on any long-term controller therapy.  This is the reason that he is
having multiple episodes of acute shortness of breath for which he is needing
admission.  In addition to quitting smoking, I recommend that he should get on a
long-term controller medicine regimen for chronic obstructive pulmonary disease
as well as have his suspected sleep apnea evaluated to try to end this vicious
cycle.  Thromboembolism is less likely; however, we have not fully ruled it out
at this point.
2.  Chronic obstructive pulmonary disease exacerbation.  I cut down his
Solu-Medrol to 40 mg q.8 hours.  We will continue with nebulized bronchodilators
as currently prescribed.  In the long run, I do feel that he will benefit from
an inhaled corticosteroid; however, I temporarily discontinued his inhaled
corticosteroid for now as he has thrush.  In the long run, if the patient is
interested, then I will be happy to see him in the office and come up with a
long-term regimen for his chronic obstructive pulmonary disease.  He has
Symbicort; however, he does not know the correct technique and needs to be
taught the correct technique.  If Symbicort is continued, then we may need to
add a long-acting anticholinergic agent, either Incruse or Spiriva, perhaps an
easier alternate could be to use low dose Trelegy Ellipta inhaler 1 inhalation
once a day in addition to continuing with p.r.n. albuterol.
3.  Thrush.  We will go ahead and treat him with fluconazole.  The patient
should also rinse his mouth and gargle after using inhaled corticosteroids once
he is restarted on these.
4.  Hypersomnia.  Recommend obtaining an outpatient sleep study; and then, we
will likely set him up with a CPAP or a BiPAP.
5.  Evaluation for thromboembolic phenomena.  Overall, my suspicion is low.  His
pulmonary artery systolic pressure is not elevated at 32.  Regardless, I do
recommend that we go ahead and obtain a D-dimer.  In case the D-dimer comes back
elevated, I would suggest considering a CTA chest.
6.  Evaluation for cardiac etiologies of shortness of breath.  Primarily, the
patient's shortness of breath appears to be secondary to insufficiently treated
chronic obstructive pulmonary disease and sleep apnea, but also obtaining a
cardiac stress test will be reasonable.  I would defer to the primary service as
to whether this is considered during this admission or deferred to later as an
outpatient.
7.  Monitoring of liver function enzymes.  The same is recommended while on
fluconazole.
8.  Deep vein thrombosis prophylaxis.  We will start Lovenox.
 
Thanks for this consultation.
 
 
 
                       
                                        By:                                
                 
D: 03/05/21 2244_______________________________________
T: 03/06/21 0042Aisi Yee MD                 /nt

## 2021-03-05 VITALS — SYSTOLIC BLOOD PRESSURE: 129 MMHG | DIASTOLIC BLOOD PRESSURE: 86 MMHG

## 2021-03-05 VITALS — DIASTOLIC BLOOD PRESSURE: 75 MMHG | SYSTOLIC BLOOD PRESSURE: 123 MMHG

## 2021-03-05 VITALS — DIASTOLIC BLOOD PRESSURE: 87 MMHG | SYSTOLIC BLOOD PRESSURE: 137 MMHG

## 2021-03-05 VITALS — SYSTOLIC BLOOD PRESSURE: 121 MMHG | DIASTOLIC BLOOD PRESSURE: 67 MMHG

## 2021-03-05 VITALS — SYSTOLIC BLOOD PRESSURE: 109 MMHG | DIASTOLIC BLOOD PRESSURE: 79 MMHG

## 2021-03-05 NOTE — 2DMMODE
[place] [address]
Phone:  [phone] 2 D/M-MODE ECHOCARDIOGRAM     
_______________________________________________________________________________
 
Name:         MITRA PAYNE                 Room:          [address]
MLawrenceRLawrence#:    [id number]     Account #:     [id number]  
Admission:    21    Attend Phys:   Jasvir Gilbert, 
Discharge:                Date of Birth: [birth year]  
Date of Service: 21  Report #:      [id number]
        [id number]
_______________________________________________________________________________
THIS REPORT FOR:
 
cc:  FAM - No family physician/PCP 
     FAM - No family physician/PCP 
     Mayco Weeks MD [facility]        
                                                                       ~
 
--------------- APPROVED REPORT --------------
 
 
Study performed:  2021 14:45:31
 
EXAM: Comprehensive 2D, Doppler, and color-flow 
Echocardiogram 
Patient Location: In-Patient   
Room #:  [facility]     Status:  routine
 
      BSA:         2.38
HR: 89 bpm BP:          123/75 mmHg 
Rhythm: NSR     
 
Other Information 
Study Quality: Good
 
Indications
COPD
Dyspnea 
 
2D Dimensions
IVSd:  11.23 (7-11mm) LVOT Diam:  21.15 (18-24mm) 
LVDd:  55.01 mm  
PWd:  9.73 (7-11mm) Ascending Ao:  30.26 (22-36mm)
LVDs:  26.50 (25-40mm) 
Aortic Root:  38.81 mm 
 
Volumes
Left Atrial Volume (Systole) 
    LA ESV Index:  13.40 mL/m2
 
Aortic Valve
AoV Peak Ryan.:  1.52 m/s 
AO Peak Gr.:  9.19 mmHg  LVOT Max P.56 mmHg
AO Mean Gr.:  5.11 mmHg  LVOT Mean PG:  3.84 mmHg
    LVOT Max V:  1.28 m/s
AO V2 VTI:  23.72 cm  LVOT Mean V:  0.93 m/s
HEATHER (VTI):  3.40 cm2  LVOT V1 VTI:  22.96 cm
 
 
[place] [address]
Phone:  [phone]                     2 D/M-MODE ECHOCARDIOGRAM     
_______________________________________________________________________________
 
Name:         MITRA PAYNE                 Room:          [address]
M.R.#:    [id number]     Account #:     [id number]  
Admission:    21    Attend Phys:   Jasvir Gilbert, 
Discharge:                Date of Birth: [birth year]  
Date of Service: 21  Report #:      [id number]
        [id number]
_______________________________________________________________________________
 
Mitral Valve
    E/A Ratio:  1.23
    MV Decel. Time:  223.77 ms
MV E Max Ryan.:  0.80 m/s 
MV PHT:  64.89 ms  
MVA (PHT):  3.39 cm2  
 
TDI
E/Lateral E':  5.71 E/Medial E':  5.00
   Medial E' Ryan.:  0.16 m/s
   Lateral E' Ryan.:  0.14 m/s
 
Pulmonary Valve
PV Peak Ryan.:  1.09 m/s PV Peak Gr.:  4.73 mmHg
 
Tricuspid Valve
    RAP Estimate:  5.00 mmHg
TR Peak Gr.:  27.24 mmHg RVSP:  32.00 mmHg
    PA Pressure:  32.00 mmHg
 
Left Ventricle
The left ventricle is normal size. There is normal LV segmental wall 
motion. There is normal left ventricular wall thickness. Left 
ventricular systolic function is normal. The left ventricular 
ejection fraction is within the normal range. LVEF is 65-70%. The 
left ventricular diastolic function is normal.
 
Right Ventricle
The right ventricle is normal size. The right ventricular systolic 
function is normal.
 
Atria
The left atrium size is normal. The right atrium size is 
normal.
 
Aortic Valve
The aortic valve is normal in structure. No aortic regurgitation is 
present. There is no aortic valvular stenosis.
 
Mitral Valve
The mitral valve is normal in structure. There is no mitral valve 
regurgitation noted. No evidence of mitral valve stenosis.
 
Tricuspid Valve
The tricuspid valve is normal in structure. Trace tricuspid 
 
 
[place] [address]
Phone:  [phone]                     2 D/M-MODE ECHOCARDIOGRAM     
_______________________________________________________________________________
 
Name:         MITRA PAYNE                 Room:          [address]#:    [id number]     Account #:     [id number]  
Admission:    21    Attend Phys:   Jasvir Gilbert, 
Discharge:                Date of Birth: [birth year]  
Date of Service: 21  Report #:      [id number]
        [id number]
_______________________________________________________________________________
regurgitation. Mild pulmonary hypertension.
 
Pulmonic Valve
Pulmonic valve is not well visualized. There is no pulmonic valvular 
regurgitation.
 
Great Vessels
The aortic root is normal in size. IVC is normal in size and 
collapses >50% with inspiration.
 
Pericardium
There is no pericardial effusion.
 
<Conclusion>
Left ventricular systolic function is normal.
The left ventricular ejection fraction is within the normal range.
 
 
 
 
 
 
 
 
 
 
 
 
 
 
 
 
 
 
 
 
 
 
 
 
 
 
 
 
  <ELECTRONICALLY SIGNED>
                                           By: Mayco Weeks MD, FACC      
  21
D: 21   _____________________________________
T: 21   Mayco Weeks MD, FACC        /INF

## 2021-03-05 NOTE — EKG
[place] [address]
Phone:  [phone]                     ELECTROCARDIOGRAM REPORT      
_______________________________________________________________________________
 
Name:         MITRA PAYNE                 Room:          [address]
M.R.#:    [id number]     Account #:     [id number]  
Admission:    21    Attend Phys:   Jasvir Gilbert, 
Discharge:                Date of Birth: [birth year]  
Date of Service: 21  Report #:      [id number]
        [id number]
_______________________________________________________________________________
THIS REPORT FOR:  //name//                      
 
                         [facility] ED
                                       
Test Date:    2021               Test Time:    20:10:20
Pat Name:     MITRA PAYNE            Department:   
Patient ID:   [id number]            Room:         [address]
Gender:       M                        Technician:   GABRIEL
:          1960               Requested By: Nancy Greenwood
Order Number: [id number]    Stephan MD:   Mayco Weeks
                                 Measurements
Intervals                              Axis          
Rate:         93                       P:            72
CO:           143                      QRS:          -72
QRSD:         99                       T:            84
QT:           337                                    
QTc:          420                                    
                           Interpretive Statements
Sinus rhythm
Left anterior fascicular block
Abnormal R-wave progression, early transition
Compared to ECG 2021 06:27:12
No significant changes
Electronically Signed On 3-5-2021 9:32:49 CST by Mayco Weeks
[email or web address]
 
 
 
 
 
 
 
 
 
 
 
 
 
 
 
 
 
 
 
  <ELECTRONICALLY SIGNED>
                                           By: Mayco Weeks MD, [facility]      
  21     0932
D: 21   _____________________________________
T: 21   Mayco Weeks MD, [facility]        /EPI

## 2021-03-06 VITALS — SYSTOLIC BLOOD PRESSURE: 121 MMHG | DIASTOLIC BLOOD PRESSURE: 73 MMHG

## 2021-03-06 VITALS — SYSTOLIC BLOOD PRESSURE: 138 MMHG | DIASTOLIC BLOOD PRESSURE: 72 MMHG

## 2021-03-06 VITALS — SYSTOLIC BLOOD PRESSURE: 117 MMHG | DIASTOLIC BLOOD PRESSURE: 70 MMHG

## 2021-03-06 VITALS — SYSTOLIC BLOOD PRESSURE: 119 MMHG | DIASTOLIC BLOOD PRESSURE: 85 MMHG

## 2021-03-06 LAB
ABSOLUTE MONOCYTES: 0.4 THOU/UL (ref 0–1.2)
ANION GAP SERPL CALC-SCNC: 13 MMOL/L (ref 7–16)
BUN SERPL-MCNC: 18 MG/DL (ref 7–18)
CALCIUM SERPL-MCNC: 9.1 MG/DL (ref 8.5–10.1)
CHLORIDE SERPL-SCNC: 104 MMOL/L (ref 98–107)
CO2 SERPL-SCNC: 22 MMOL/L (ref 21–32)
CREAT SERPL-MCNC: 1 MG/DL (ref 0.6–1.3)
GLUCOSE SERPL-MCNC: 149 MG/DL (ref 70–99)
GRANULOCYTES NFR BLD MANUAL: 84 %
HCT VFR BLD CALC: 45.1 % (ref 42–52)
HGB BLD-MCNC: 14.9 GM/DL (ref 14–18)
LYMPHOCYTES # BLD: 1.8 THOU/UL (ref 0.8–5.3)
LYMPHOCYTES NFR BLD AUTO: 13 %
MAGNESIUM SERPL-MCNC: 2.3 MG/DL (ref 1.8–2.4)
MCH RBC QN AUTO: 31.5 PG (ref 26–34)
MCHC RBC AUTO-ENTMCNC: 33 G/DL (ref 28–37)
MCV RBC: 95.3 FL (ref 80–100)
MONOCYTES NFR BLD: 3 %
MPV: 8.4 FL. (ref 7.2–11.1)
NEUTROPHILS # BLD: 11.3 THOU/UL (ref 1.6–8.1)
NUCLEATED RBCS: 0 /100WBC
PLATELET # BLD EST: ADEQUATE 10*3/UL
PLATELET COUNT*: 195 THOU/UL (ref 150–400)
POTASSIUM SERPL-SCNC: 4.3 MMOL/L (ref 3.5–5.1)
RBC # BLD AUTO: 4.74 MIL/UL (ref 4.5–6)
RBC MORPH BLD: NORMAL
RDW-CV: 13.9 % (ref 10.5–14.5)
SODIUM SERPL-SCNC: 139 MMOL/L (ref 136–145)
WBC # BLD AUTO: 13.5 THOU/UL (ref 4–11)

## 2021-04-21 ENCOUNTER — HOSPITAL ENCOUNTER (OUTPATIENT)
Dept: HOSPITAL 96 - M.ERS | Age: 61
Setting detail: OBSERVATION
LOS: 1 days | Discharge: HOME | End: 2021-04-22
Attending: INTERNAL MEDICINE | Admitting: INTERNAL MEDICINE
Payer: COMMERCIAL

## 2021-04-21 VITALS — DIASTOLIC BLOOD PRESSURE: 71 MMHG | SYSTOLIC BLOOD PRESSURE: 113 MMHG

## 2021-04-21 VITALS — SYSTOLIC BLOOD PRESSURE: 149 MMHG | DIASTOLIC BLOOD PRESSURE: 93 MMHG

## 2021-04-21 VITALS — DIASTOLIC BLOOD PRESSURE: 76 MMHG | SYSTOLIC BLOOD PRESSURE: 117 MMHG

## 2021-04-21 VITALS — HEIGHT: 70.98 IN | WEIGHT: 267 LBS | BODY MASS INDEX: 37.38 KG/M2

## 2021-04-21 VITALS — DIASTOLIC BLOOD PRESSURE: 72 MMHG | SYSTOLIC BLOOD PRESSURE: 117 MMHG

## 2021-04-21 VITALS — DIASTOLIC BLOOD PRESSURE: 83 MMHG | SYSTOLIC BLOOD PRESSURE: 132 MMHG

## 2021-04-21 VITALS — DIASTOLIC BLOOD PRESSURE: 75 MMHG | SYSTOLIC BLOOD PRESSURE: 126 MMHG

## 2021-04-21 DIAGNOSIS — G47.10: ICD-10-CM

## 2021-04-21 DIAGNOSIS — E66.9: ICD-10-CM

## 2021-04-21 DIAGNOSIS — J44.1: Primary | ICD-10-CM

## 2021-04-21 DIAGNOSIS — J96.90: ICD-10-CM

## 2021-04-21 DIAGNOSIS — R79.1: ICD-10-CM

## 2021-04-21 DIAGNOSIS — Z20.822: ICD-10-CM

## 2021-04-21 DIAGNOSIS — Z79.899: ICD-10-CM

## 2021-04-21 DIAGNOSIS — Z72.3: ICD-10-CM

## 2021-04-21 DIAGNOSIS — Z91.14: ICD-10-CM

## 2021-04-21 DIAGNOSIS — F17.210: ICD-10-CM

## 2021-04-21 LAB
ABSOLUTE BASOPHILS: 0.1 THOU/UL (ref 0–0.2)
ABSOLUTE EOSINOPHILS: 0.4 THOU/UL (ref 0–0.7)
ABSOLUTE MONOCYTES: 0.7 THOU/UL (ref 0–1.2)
ALBUMIN SERPL-MCNC: 3.4 G/DL (ref 3.4–5)
ALP SERPL-CCNC: 72 U/L (ref 46–116)
ALT SERPL-CCNC: 36 U/L (ref 30–65)
ANION GAP SERPL CALC-SCNC: 9 MMOL/L (ref 7–16)
AST SERPL-CCNC: 15 U/L (ref 15–37)
BASOPHILS NFR BLD AUTO: 0.8 %
BILIRUB SERPL-MCNC: 0.4 MG/DL
BUN SERPL-MCNC: 13 MG/DL (ref 7–18)
CALCIUM SERPL-MCNC: 8.6 MG/DL (ref 8.5–10.1)
CHLORIDE SERPL-SCNC: 107 MMOL/L (ref 98–107)
CO2 SERPL-SCNC: 23 MMOL/L (ref 21–32)
CREAT SERPL-MCNC: 0.9 MG/DL (ref 0.6–1.3)
EOSINOPHIL NFR BLD: 4.2 %
GLUCOSE SERPL-MCNC: 106 MG/DL (ref 70–99)
GRANULOCYTES NFR BLD MANUAL: 63.2 %
HCT VFR BLD CALC: 47.4 % (ref 42–52)
HGB BLD-MCNC: 15.9 GM/DL (ref 14–18)
INR PPP: 1
LYMPHOCYTES # BLD: 2 THOU/UL (ref 0.8–5.3)
LYMPHOCYTES NFR BLD AUTO: 23.3 %
MAGNESIUM SERPL-MCNC: 2.3 MG/DL (ref 1.8–2.4)
MCH RBC QN AUTO: 32 PG (ref 26–34)
MCHC RBC AUTO-ENTMCNC: 33.5 G/DL (ref 28–37)
MCV RBC: 95.6 FL (ref 80–100)
MONOCYTES NFR BLD: 8.5 %
MPV: 8.2 FL. (ref 7.2–11.1)
NEUTROPHILS # BLD: 5.5 THOU/UL (ref 1.6–8.1)
NT-PRO BRAIN NAT PEPTIDE: 90 PG/ML (ref ?–300)
NUCLEATED RBCS: 0 /100WBC
PLATELET COUNT*: 172 THOU/UL (ref 150–400)
POTASSIUM SERPL-SCNC: 3.9 MMOL/L (ref 3.5–5.1)
PROT SERPL-MCNC: 7.2 G/DL (ref 6.4–8.2)
PROTHROMBIN TIME: 10.6 SECONDS (ref 9.2–11.5)
RBC # BLD AUTO: 4.96 MIL/UL (ref 4.5–6)
RDW-CV: 14.2 % (ref 10.5–14.5)
SODIUM SERPL-SCNC: 139 MMOL/L (ref 136–145)
WBC # BLD AUTO: 8.7 THOU/UL (ref 4–11)

## 2021-04-22 VITALS — DIASTOLIC BLOOD PRESSURE: 76 MMHG | SYSTOLIC BLOOD PRESSURE: 121 MMHG

## 2021-04-22 VITALS — SYSTOLIC BLOOD PRESSURE: 128 MMHG | DIASTOLIC BLOOD PRESSURE: 77 MMHG

## 2021-04-22 VITALS — SYSTOLIC BLOOD PRESSURE: 121 MMHG | DIASTOLIC BLOOD PRESSURE: 76 MMHG

## 2021-04-22 LAB
ANION GAP SERPL CALC-SCNC: 11 MMOL/L (ref 7–16)
BUN SERPL-MCNC: 15 MG/DL (ref 7–18)
CALCIUM SERPL-MCNC: 9 MG/DL (ref 8.5–10.1)
CHLORIDE SERPL-SCNC: 105 MMOL/L (ref 98–107)
CO2 SERPL-SCNC: 22 MMOL/L (ref 21–32)
CREAT SERPL-MCNC: 1 MG/DL (ref 0.6–1.3)
GLUCOSE SERPL-MCNC: 136 MG/DL (ref 70–99)
HCT VFR BLD CALC: 45.7 % (ref 42–52)
HGB BLD-MCNC: 15 GM/DL (ref 14–18)
MCH RBC QN AUTO: 31.5 PG (ref 26–34)
MCHC RBC AUTO-ENTMCNC: 32.8 G/DL (ref 28–37)
MCV RBC: 96.2 FL (ref 80–100)
MPV: 8.6 FL. (ref 7.2–11.1)
PLATELET COUNT*: 185 THOU/UL (ref 150–400)
POTASSIUM SERPL-SCNC: 4.1 MMOL/L (ref 3.5–5.1)
RBC # BLD AUTO: 4.75 MIL/UL (ref 4.5–6)
RDW-CV: 14 % (ref 10.5–14.5)
SODIUM SERPL-SCNC: 138 MMOL/L (ref 136–145)
WBC # BLD AUTO: 12.1 THOU/UL (ref 4–11)

## 2021-04-22 NOTE — EKG
[place] [address]
Phone:  [phone]                     ELECTROCARDIOGRAM REPORT      
_______________________________________________________________________________
 
Name:         MITRA PAYNE                 Room:          [address]
M.R.#:    [id number]     Account #:     [id number]  
Admission:    21    Attend Phys:   Luis Irvin
Discharge:                Date of Birth: [birth year]  
Date of Service: 21  Report #:      [id number]
        [id number]
_______________________________________________________________________________
THIS REPORT FOR:  //name//                      
 
                         [facility] ED
                                       
Test Date:    2021               Test Time:    05:23:47
Pat Name:     MITRA PAYNE            Department:   
Patient ID:   [id number]            Room:         [facility]
Gender:       M                        Technician:   FL
:          1960               Requested By: Karla Logan
Order Number: [id number] MD:   Benedict Jean
                                 Measurements
Intervals                              Axis          
Rate:         92                       P:            75
NH:           141                      QRS:          -71
QRSD:         96                       T:            82
QT:           350                                    
QTc:          433                                    
                           Interpretive Statements
Sinus rhythm
Left anterior fascicular block
Abnormal R-wave progression, early transition
Baseline wander in lead(s) V4
Compared to ECG 2021 20:10:20
No significant changes
Electronically Signed On 2021 12:07:24 CDT by Benedict Jean
[email or web address]
 
 
 
 
 
 
 
 
 
 
 
 
 
 
 
 
 
 
  <ELECTRONICALLY SIGNED>
                                           By: Benedict Jean MD, [facility]     
  21     1207
D: 21   _____________________________________
T: 21   Benedict Jean MD, [facility]       /EPI